# Patient Record
Sex: MALE | HISPANIC OR LATINO | Employment: UNEMPLOYED | URBAN - METROPOLITAN AREA
[De-identification: names, ages, dates, MRNs, and addresses within clinical notes are randomized per-mention and may not be internally consistent; named-entity substitution may affect disease eponyms.]

---

## 2017-05-12 PROCEDURE — 99285 EMERGENCY DEPT VISIT HI MDM: CPT

## 2017-05-13 ENCOUNTER — APPOINTMENT (OUTPATIENT)
Dept: RADIOLOGY | Facility: MEDICAL CENTER | Age: 42
End: 2017-05-13
Attending: EMERGENCY MEDICINE
Payer: COMMERCIAL

## 2017-05-13 ENCOUNTER — APPOINTMENT (OUTPATIENT)
Dept: RADIOLOGY | Facility: MEDICAL CENTER | Age: 42
End: 2017-05-13
Attending: INTERNAL MEDICINE
Payer: COMMERCIAL

## 2017-05-13 ENCOUNTER — HOSPITAL ENCOUNTER (OUTPATIENT)
Facility: MEDICAL CENTER | Age: 42
End: 2017-05-13
Attending: EMERGENCY MEDICINE | Admitting: INTERNAL MEDICINE
Payer: COMMERCIAL

## 2017-05-13 ENCOUNTER — RESOLUTE PROFESSIONAL BILLING HOSPITAL PROF FEE (OUTPATIENT)
Dept: HOSPITALIST | Facility: MEDICAL CENTER | Age: 42
End: 2017-05-13
Payer: COMMERCIAL

## 2017-05-13 VITALS
DIASTOLIC BLOOD PRESSURE: 79 MMHG | HEIGHT: 66 IN | WEIGHT: 239.64 LBS | RESPIRATION RATE: 13 BRPM | HEART RATE: 77 BPM | SYSTOLIC BLOOD PRESSURE: 119 MMHG | BODY MASS INDEX: 38.51 KG/M2 | TEMPERATURE: 98.2 F | OXYGEN SATURATION: 99 %

## 2017-05-13 DIAGNOSIS — R07.9 EXERTIONAL CHEST PAIN: ICD-10-CM

## 2017-05-13 LAB
ALBUMIN SERPL BCP-MCNC: 5.1 G/DL (ref 3.2–4.9)
ALBUMIN/GLOB SERPL: 1.8 G/DL
ALP SERPL-CCNC: 78 U/L (ref 30–99)
ALT SERPL-CCNC: 50 U/L (ref 2–50)
ANION GAP SERPL CALC-SCNC: 11 MMOL/L (ref 0–11.9)
APTT PPP: 29.2 SEC (ref 24.7–36)
AST SERPL-CCNC: 27 U/L (ref 12–45)
BASOPHILS # BLD AUTO: 0.6 % (ref 0–1.8)
BASOPHILS # BLD: 0.06 K/UL (ref 0–0.12)
BILIRUB SERPL-MCNC: 2 MG/DL (ref 0.1–1.5)
BNP SERPL-MCNC: 5 PG/ML (ref 0–100)
BUN SERPL-MCNC: 15 MG/DL (ref 8–22)
CALCIUM SERPL-MCNC: 10.5 MG/DL (ref 8.5–10.5)
CHLORIDE SERPL-SCNC: 105 MMOL/L (ref 96–112)
CO2 SERPL-SCNC: 22 MMOL/L (ref 20–33)
CREAT SERPL-MCNC: 1.16 MG/DL (ref 0.5–1.4)
EKG IMPRESSION: NORMAL
EOSINOPHIL # BLD AUTO: 0.12 K/UL (ref 0–0.51)
EOSINOPHIL NFR BLD: 1.3 % (ref 0–6.9)
ERYTHROCYTE [DISTWIDTH] IN BLOOD BY AUTOMATED COUNT: 39.9 FL (ref 35.9–50)
GFR SERPL CREATININE-BSD FRML MDRD: >60 ML/MIN/1.73 M 2
GLOBULIN SER CALC-MCNC: 2.8 G/DL (ref 1.9–3.5)
GLUCOSE SERPL-MCNC: 109 MG/DL (ref 65–99)
HCT VFR BLD AUTO: 47 % (ref 42–52)
HGB BLD-MCNC: 16.2 G/DL (ref 14–18)
IMM GRANULOCYTES # BLD AUTO: 0.02 K/UL (ref 0–0.11)
IMM GRANULOCYTES NFR BLD AUTO: 0.2 % (ref 0–0.9)
INR PPP: 1.05 (ref 0.87–1.13)
LIPASE SERPL-CCNC: 12 U/L (ref 11–82)
LYMPHOCYTES # BLD AUTO: 2.54 K/UL (ref 1–4.8)
LYMPHOCYTES NFR BLD: 26.8 % (ref 22–41)
MCH RBC QN AUTO: 28.8 PG (ref 27–33)
MCHC RBC AUTO-ENTMCNC: 34.5 G/DL (ref 33.7–35.3)
MCV RBC AUTO: 83.5 FL (ref 81.4–97.8)
MONOCYTES # BLD AUTO: 0.61 K/UL (ref 0–0.85)
MONOCYTES NFR BLD AUTO: 6.4 % (ref 0–13.4)
NEUTROPHILS # BLD AUTO: 6.14 K/UL (ref 1.82–7.42)
NEUTROPHILS NFR BLD: 64.7 % (ref 44–72)
NRBC # BLD AUTO: 0 K/UL
NRBC BLD AUTO-RTO: 0 /100 WBC
PLATELET # BLD AUTO: 305 K/UL (ref 164–446)
PMV BLD AUTO: 10.5 FL (ref 9–12.9)
POTASSIUM SERPL-SCNC: 3.9 MMOL/L (ref 3.6–5.5)
PROT SERPL-MCNC: 7.9 G/DL (ref 6–8.2)
PROTHROMBIN TIME: 14 SEC (ref 12–14.6)
RBC # BLD AUTO: 5.63 M/UL (ref 4.7–6.1)
SODIUM SERPL-SCNC: 138 MMOL/L (ref 135–145)
TROPONIN I SERPL-MCNC: <0.01 NG/ML (ref 0–0.04)
TSH SERPL DL<=0.005 MIU/L-ACNC: 2.51 UIU/ML (ref 0.3–3.7)
WBC # BLD AUTO: 9.5 K/UL (ref 4.8–10.8)

## 2017-05-13 PROCEDURE — G0378 HOSPITAL OBSERVATION PER HR: HCPCS

## 2017-05-13 PROCEDURE — 99235 HOSP IP/OBS SAME DATE MOD 70: CPT | Performed by: HOSPITALIST

## 2017-05-13 PROCEDURE — 85025 COMPLETE CBC W/AUTO DIFF WBC: CPT

## 2017-05-13 PROCEDURE — A9502 TC99M TETROFOSMIN: HCPCS

## 2017-05-13 PROCEDURE — A9270 NON-COVERED ITEM OR SERVICE: HCPCS | Performed by: INTERNAL MEDICINE

## 2017-05-13 PROCEDURE — 85730 THROMBOPLASTIN TIME PARTIAL: CPT

## 2017-05-13 PROCEDURE — 94760 N-INVAS EAR/PLS OXIMETRY 1: CPT

## 2017-05-13 PROCEDURE — A9270 NON-COVERED ITEM OR SERVICE: HCPCS | Performed by: NURSE PRACTITIONER

## 2017-05-13 PROCEDURE — 84443 ASSAY THYROID STIM HORMONE: CPT

## 2017-05-13 PROCEDURE — 36415 COLL VENOUS BLD VENIPUNCTURE: CPT

## 2017-05-13 PROCEDURE — A9270 NON-COVERED ITEM OR SERVICE: HCPCS | Performed by: EMERGENCY MEDICINE

## 2017-05-13 PROCEDURE — 71010 DX-CHEST-LIMITED (1 VIEW): CPT

## 2017-05-13 PROCEDURE — 83690 ASSAY OF LIPASE: CPT

## 2017-05-13 PROCEDURE — 700111 HCHG RX REV CODE 636 W/ 250 OVERRIDE (IP)

## 2017-05-13 PROCEDURE — 83880 ASSAY OF NATRIURETIC PEPTIDE: CPT

## 2017-05-13 PROCEDURE — 700102 HCHG RX REV CODE 250 W/ 637 OVERRIDE(OP): Performed by: INTERNAL MEDICINE

## 2017-05-13 PROCEDURE — 80053 COMPREHEN METABOLIC PANEL: CPT

## 2017-05-13 PROCEDURE — 85610 PROTHROMBIN TIME: CPT

## 2017-05-13 PROCEDURE — 93005 ELECTROCARDIOGRAM TRACING: CPT

## 2017-05-13 PROCEDURE — 700102 HCHG RX REV CODE 250 W/ 637 OVERRIDE(OP): Performed by: EMERGENCY MEDICINE

## 2017-05-13 PROCEDURE — 700102 HCHG RX REV CODE 250 W/ 637 OVERRIDE(OP): Performed by: NURSE PRACTITIONER

## 2017-05-13 PROCEDURE — 700105 HCHG RX REV CODE 258: Performed by: INTERNAL MEDICINE

## 2017-05-13 PROCEDURE — 84484 ASSAY OF TROPONIN QUANT: CPT | Mod: 91

## 2017-05-13 RX ORDER — LEVOTHYROXINE SODIUM 0.05 MG/1
50 TABLET ORAL
COMMUNITY

## 2017-05-13 RX ORDER — AMOXICILLIN 250 MG
2 CAPSULE ORAL 2 TIMES DAILY
Status: DISCONTINUED | OUTPATIENT
Start: 2017-05-13 | End: 2017-05-13 | Stop reason: HOSPADM

## 2017-05-13 RX ORDER — SODIUM CHLORIDE 9 MG/ML
INJECTION, SOLUTION INTRAVENOUS CONTINUOUS
Status: DISCONTINUED | OUTPATIENT
Start: 2017-05-13 | End: 2017-05-13 | Stop reason: HOSPADM

## 2017-05-13 RX ORDER — ASPIRIN 325 MG
325 TABLET ORAL DAILY
Status: DISCONTINUED | OUTPATIENT
Start: 2017-05-13 | End: 2017-05-13 | Stop reason: HOSPADM

## 2017-05-13 RX ORDER — ACETAMINOPHEN 325 MG/1
650 TABLET ORAL EVERY 6 HOURS PRN
Status: DISCONTINUED | OUTPATIENT
Start: 2017-05-13 | End: 2017-05-13 | Stop reason: HOSPADM

## 2017-05-13 RX ORDER — HEPARIN SODIUM 5000 [USP'U]/ML
5000 INJECTION, SOLUTION INTRAVENOUS; SUBCUTANEOUS EVERY 8 HOURS
Status: DISCONTINUED | OUTPATIENT
Start: 2017-05-13 | End: 2017-05-13 | Stop reason: HOSPADM

## 2017-05-13 RX ORDER — ASPIRIN 81 MG/1
324 TABLET, CHEWABLE ORAL DAILY
Status: DISCONTINUED | OUTPATIENT
Start: 2017-05-13 | End: 2017-05-13 | Stop reason: HOSPADM

## 2017-05-13 RX ORDER — ASPIRIN 300 MG/1
300 SUPPOSITORY RECTAL DAILY
Status: DISCONTINUED | OUTPATIENT
Start: 2017-05-13 | End: 2017-05-13 | Stop reason: HOSPADM

## 2017-05-13 RX ORDER — LISINOPRIL 10 MG/1
10 TABLET ORAL DAILY
Status: DISCONTINUED | OUTPATIENT
Start: 2017-05-13 | End: 2017-05-13 | Stop reason: HOSPADM

## 2017-05-13 RX ORDER — ASPIRIN 81 MG/1
81 TABLET, CHEWABLE ORAL DAILY
Qty: 100 TAB | Refills: 3 | Status: SHIPPED | OUTPATIENT
Start: 2017-05-13

## 2017-05-13 RX ORDER — AMLODIPINE BESYLATE 5 MG/1
5 TABLET ORAL DAILY
Status: DISCONTINUED | OUTPATIENT
Start: 2017-05-13 | End: 2017-05-13 | Stop reason: HOSPADM

## 2017-05-13 RX ORDER — NITROGLYCERIN 0.4 MG/1
0.4 TABLET SUBLINGUAL
Status: DISCONTINUED | OUTPATIENT
Start: 2017-05-13 | End: 2017-05-13 | Stop reason: HOSPADM

## 2017-05-13 RX ORDER — LEVOTHYROXINE SODIUM 0.05 MG/1
50 TABLET ORAL
Status: DISCONTINUED | OUTPATIENT
Start: 2017-05-13 | End: 2017-05-13 | Stop reason: HOSPADM

## 2017-05-13 RX ORDER — BISACODYL 10 MG
10 SUPPOSITORY, RECTAL RECTAL
Status: DISCONTINUED | OUTPATIENT
Start: 2017-05-13 | End: 2017-05-13 | Stop reason: HOSPADM

## 2017-05-13 RX ORDER — POLYETHYLENE GLYCOL 3350 17 G/17G
1 POWDER, FOR SOLUTION ORAL
Status: DISCONTINUED | OUTPATIENT
Start: 2017-05-13 | End: 2017-05-13 | Stop reason: HOSPADM

## 2017-05-13 RX ORDER — AMLODIPINE BESYLATE 5 MG/1
5 TABLET ORAL DAILY
COMMUNITY

## 2017-05-13 RX ORDER — REGADENOSON 0.08 MG/ML
INJECTION, SOLUTION INTRAVENOUS
Status: COMPLETED
Start: 2017-05-13 | End: 2017-05-13

## 2017-05-13 RX ADMIN — ACETAMINOPHEN 650 MG: 325 TABLET, FILM COATED ORAL at 05:57

## 2017-05-13 RX ADMIN — NITROGLYCERIN 0.4 MG: 0.4 TABLET SUBLINGUAL at 02:47

## 2017-05-13 RX ADMIN — REGADENOSON 0.4 MG: 0.08 INJECTION, SOLUTION INTRAVENOUS at 12:01

## 2017-05-13 RX ADMIN — AMLODIPINE BESYLATE 5 MG: 5 TABLET ORAL at 12:56

## 2017-05-13 RX ADMIN — ASPIRIN 325 MG: 325 TABLET, COATED ORAL at 09:11

## 2017-05-13 RX ADMIN — LEVOTHYROXINE SODIUM 50 MCG: 50 TABLET ORAL at 05:58

## 2017-05-13 RX ADMIN — NITROGLYCERIN 0.4 MG: 0.4 TABLET SUBLINGUAL at 02:59

## 2017-05-13 RX ADMIN — LISINOPRIL 10 MG: 10 TABLET ORAL at 12:56

## 2017-05-13 RX ADMIN — SODIUM CHLORIDE: 9 INJECTION, SOLUTION INTRAVENOUS at 05:22

## 2017-05-13 ASSESSMENT — LIFESTYLE VARIABLES
DO YOU DRINK ALCOHOL: NO
EVER_SMOKED: NEVER
EVER_SMOKED: NEVER

## 2017-05-13 ASSESSMENT — PAIN SCALES - WONG BAKER
WONGBAKER_NUMERICALRESPONSE: DOESN'T HURT AT ALL

## 2017-05-13 ASSESSMENT — ENCOUNTER SYMPTOMS
DIAPHORESIS: 0
DIARRHEA: 0
VOMITING: 0
FEVER: 0
CHILLS: 0
SHORTNESS OF BREATH: 1
ABDOMINAL PAIN: 0
NAUSEA: 0

## 2017-05-13 ASSESSMENT — PAIN SCALES - GENERAL
PAINLEVEL_OUTOF10: 0

## 2017-05-13 NOTE — PROGRESS NOTES
Stress test results is out. Discharged to home. [facility] teaching. IV line is removed.

## 2017-05-13 NOTE — PROGRESS NOTES
Assumed patient care, Hebrew speaking only. Discuss about stress test today at 11, for troponin at 9:30. Resting comfortably. Sinus Rhythm on cardiac monitor.

## 2017-05-13 NOTE — IP AVS SNAPSHOT
" Home Care Instructions                                                                                                                  Name:Chano Bray  Medical Record Number:[id number]  CSN: [id number]    YOB: 1975   Age: 41 y.o.  Sex: male  HT:1.676 m (5' 6\") WT: 108.7 kg (239 lb 10.2 oz)          Admit Date: 5/13/2017     Discharge Date:   Today's Date: 5/13/2017  Attending Doctor:  Nikolas Hurley M.D.                  Allergies:  Pcn            Discharge Instructions       Discharge Instructions    Discharged to home by car with relative. Discharged via walking, hospital escort: Yes.  Special equipment needed: Not Applicable    Be sure to schedule a follow-up appointment with your primary care doctor or any specialists as instructed.     Discharge Plan:   Diet Plan: Discussed  Activity Level: Discussed  Confirmed Follow up Appointment: Patient to Call and Schedule Appointment  Confirmed Symptoms Management: Discussed  Medication Reconciliation Updated: Yes  Influenza Vaccine Indication: Patient Refuses    I understand that a diet low in cholesterol, fat, and sodium is recommended for good health. Unless I have been given specific instructions below for another diet, I accept this instruction as my diet prescription.   Other diet: Regular Diet    Special Instructions: None    · Is patient discharged on Warfarin / Coumadin?   No     · Is patient Post Blood Transfusion?  No    Depression / Suicide Risk    As you are discharged from this [facility] facility, it is important to learn how to keep safe from harming yourself.    Recognize the warning signs:  · Abrupt changes in personality, positive or negative- including increase in energy   · Giving away possessions  · Change in eating patterns- significant weight changes-  positive or negative  · Change in sleeping patterns- unable to sleep or sleeping all the time   · Unwillingness or inability to communicate  · Depression  · Unusual " sadness, discouragement and loneliness  · Talk of wanting to die  · Neglect of personal appearance   · Rebelliousness- reckless behavior  · Withdrawal from people/activities they love  · Confusion- inability to concentrate     If you or a loved one observes any of these behaviors or has concerns about self-harm, here's what you can do:  · Talk about it- your feelings and reasons for harming yourself  · Remove any means that you might use to hurt yourself (examples: pills, rope, extension cords, firearm)  · Get professional help from the community (Mental Health, Substance Abuse, psychological counseling)  · Do not be alone:Call your Safe Contact- someone whom you trust who will be there for you.  · Call your local CRISIS HOTLINE 636-1411 or 225-020-1996  · Call your local Children's Mobile Crisis Response Team [place] (558) 701-3679 or www.[employer]  · Call the toll free National Suicide Prevention Hotlines   · National Suicide Prevention Lifeline 298-043-ETMQ (3485)  · [employer] Hope Line Network 800-SUICIDE (824-5018)        Follow-up Information     1. Follow up with Pcp Pt States None.    Specialty:  Family Medicine    Why:  please establish a Primary Doctor.          Discharge Medication Instructions:    Below are the medications your physician expects you to take upon discharge:    Review all your home medications and newly ordered medications with your doctor and/or pharmacist. Follow medication instructions as directed by your doctor and/or pharmacist.    Please keep your medication list with you and share with your physician.               Medication List      START taking these medications        Instructions    Morning Afternoon Evening Bedtime    aspirin 81 MG Chew chewable tablet   Commonly known as:  ASA        Take 1 Tab by mouth every day.   Dose:  81 mg                          CONTINUE taking these medications        Instructions    Morning Afternoon Evening Bedtime    amlodipine 5 MG  Tabs   Last time this was given:  5 mg on 5/13/2017 12:56 PM   Commonly known as:  NORVASC        Take 5 mg by mouth every day.   Dose:  5 mg                        levothyroxine 50 MCG Tabs   Last time this was given:  50 mcg on 5/13/2017  5:58 AM   Commonly known as:  SYNTHROID        Take 50 mcg by mouth Every morning on an empty stomach.   Dose:  50 mcg                        lisinopril 10 MG Tabs   Last time this was given:  10 mg on 5/13/2017 12:56 PM   Commonly known as:  PRINIVIL        Take 1 Tab by mouth every day.   Dose:  10 mg                             Where to Get Your Medications      Information about where to get these medications is not yet available     ! Ask your nurse or doctor about these medications    - aspirin 81 MG Chew chewable tablet            Instructions           Diet / Nutrition:    Follow any diet instructions given to you by your doctor or the dietician, including how much salt (sodium) you are allowed each day.    If you are overweight, talk to your doctor about a weight reduction plan.    Activity:    Remain physically active following your doctor's instructions about exercise and activity.    Rest often.     Any time you become even a little tired or short of breath, SIT DOWN and rest.    Worsening Symptoms:    Report any of the following signs and symptoms to the doctor's office immediately:    *Pain of jaw, arm, or neck  *Chest pain not relieved by medication                               *Dizziness or loss of consciousness  *Difficulty breathing even when at rest   *More tired than usual                                       *Bleeding drainage or swelling of surgical site  *Swelling of feet, ankles, legs or stomach                 *Fever (>100ºF)  *Pink or blood tinged sputum  *Weight gain (3lbs/day or 5lbs /week)           *Shock from internal defibrillator (if applicable)  *Palpitations or irregular heartbeats                *Cool and/or numb extremities    Stroke  Awareness    Common Risk Factors for Stroke include:    Age  Atrial Fibrillation  Carotid Artery Stenosis  Diabetes Mellitus  Excessive alcohol consumption  High blood pressure  Overweight   Physical inactivity  Smoking    Warning signs and symptoms of a stroke include:    *Sudden numbness or weakness of the face, arm or leg (especially on one side of the body).  *Sudden confusion, trouble speaking or understanding.  *Sudden trouble seeing in one or both eyes.  *Sudden trouble walking, dizziness, loss of balance or coordination.Sudden severe headache with no known cause.    It is very important to get treatment quickly when a stroke occurs. If you experience any of the above warning signs, call 911 immediately.                   Disclaimer         Quit Smoking / Tobacco Use:    I understand the use of any tobacco products increases my chance of suffering from future heart disease or stroke and could cause other illnesses which may shorten my life. Quitting the use of tobacco products is the single most important thing I can do to improve my health. For further information on smoking / tobacco cessation call a Toll Free Quit Line at 1-625.963.9978 (*National Cancer [place]) or 1-370.620.5701 (American Lung Association) or you can access the web based program at www.lungusa.org.    [place] Tobacco Users Help Line:  (609) 689-7210       Toll Free: 1-578.170.3303  Quit Tobacco Program [facility] [phone])575-1963    Crisis Hotline:    [place] Crisis Hotline:  2-516-HWFEJKJ or 1-567.638.1570    [place] Crisis Hotline:    1-511.374.3946 or 419-847-7627    Discharge Survey:   Thank you for choosing [facility]. We hope we did everything we could to make your hospital stay a pleasant one. You may be receiving a phone survey and we would appreciate your time and participation in answering the questions. Your input is very valuable to us in our efforts to improve our service to our patients and their  families.        My signature on this form indicates that:    1. I have reviewed and understand the above information.  2. My questions regarding this information have been answered to my satisfaction.  3. I have formulated a plan with my discharge nurse to obtain my prescribed medications for home.                  Disclaimer         __________________________________                     __________       ________                       Patient Signature                                                 Date                    Time

## 2017-05-13 NOTE — ED NOTES
"Chief Complaint   Patient presents with   • Chest Pain   • Back Pain   • Shortness of Breath       X 2 weeks. EKG done in triage.  Wife translating.  Hx HTN. When laying flat pt feels like he cannot breathe. Pt in NAD in triage.  No OTC medicine - wife states he doesn't want to take any medicine.     /93 mmHg  Pulse 103  Temp(Src) 37.3 °C (99.1 °F)  Resp 18  Ht 1.753 m (5' 9\")  Wt 110 kg (242 lb 8.1 oz)  BMI 35.80 kg/m2  SpO2 99%      Pt Informed regarding triage process and verbalized understanding to inform triage tech or RN for any changes in condition.  Placed in lobby.    "

## 2017-05-13 NOTE — ED PROVIDER NOTES
ED Provider Note    Scribed for Rufus Garcia M.D. by Linda Lin. 5/13/2017, 2:01 AM.    Primary care provider: Pcp Pt States None  Means of arrival: Walk-in   History obtained from: Patient, wife  History limited by: None     CHIEF COMPLAINT  Chief Complaint   Patient presents with   • Chest Pain   • Back Pain   • Shortness of Breath       HPI  Chano Bray is a 41 y.o. male who presents to the Emergency Department for intermittent left sided chest pain for the past 2 weeks. This pain radiates into his left shoulder. He describes it as a tearing pain in quality. Lifting things exacerbates his pain but just moving his arm or walking does not. The patient reports associated shortness of breath. Laying flat exacerbates his shortness of breath. Wife reports that the patient has a history of hypertension and has been taking his blood pressure medications as prescribed. The patient ran out of his thyroid medications but has began taking them again 3 days ago. Patient denies fevers, chills, nausea, vomiting, diarrhea, abdominal pain, leg pain/swelling, sweating. He denies a history of heart attack, blood clots.       REVIEW OF SYSTEMS  Review of Systems   Constitutional: Negative for fever, chills and diaphoresis.   Respiratory: Positive for shortness of breath.    Cardiovascular: Positive for chest pain. Negative for leg swelling.   Gastrointestinal: Negative for nausea, vomiting, abdominal pain and diarrhea.   All other systems reviewed and are negative.      PAST MEDICAL HISTORY   has a past medical history of Hypertension and Thyroid disease.    SURGICAL HISTORY  patient denies any surgical history    SOCIAL HISTORY  Social History   Substance Use Topics   • Smoking status: Never Smoker    • Smokeless tobacco: None   • Alcohol Use: No      History   Drug Use No       FAMILY HISTORY  History reviewed. No pertinent family history.    CURRENT MEDICATIONS  Home Medications     Reviewed by Sherrill  "SCAR Lopez R.N. (Registered Nurse) on 05/13/17 at 0013  Med List Status: Complete    Medication Last Dose Status    amlodipine (NORVASC) 5 MG Tab 5/12/2017 Active    levothyroxine (SYNTHROID) 50 MCG Tab 5/12/2017 Active    lisinopril (PRINIVIL) 10 MG TABS 5/12/2017 Active                ALLERGIES  Allergies   Allergen Reactions   • Pcn [Penicillins] Swelling       PHYSICAL EXAM  VITAL SIGNS: /93 mmHg  Pulse 84  Temp(Src) 37.3 °C (99.1 °F)  Resp 17  Ht 1.753 m (5' 9\")  Wt 110 kg (242 lb 8.1 oz)  BMI 35.80 kg/m2  SpO2 98%    Constitutional: Well developed, Well nourished, Mild distress.   HENT: Normocephalic, Atraumatic.   Eyes: Conjunctiva normal, No discharge.    Cardiovascular: Normal heart rate, Normal rhythm, No murmurs, equal pulses.   Pulmonary: Normal breath sounds, No respiratory distress, No wheezing, No rales, No rhonchi.  Chest: No chest wall tenderness or deformity.   Abdomen:Soft, No tenderness, No masses, no rebound, no guarding.   Musculoskeletal: No major deformities noted, No tenderness. No calf tenderness or palpable cords. Chest pain not reproducible with movement or palpation left shoulder.  Skin: Warm, Dry, No erythema, No rash.   Neurologic: Alert & oriented x 3, Normal motor function,  No focal deficits noted.   Psychiatric: Affect normal, Judgment normal, Mood normal.     LABS  Results for orders placed or performed during the hospital encounter of 05/13/17   Troponin   Result Value Ref Range    Troponin I <0.01 0.00 - 0.04 ng/mL   Btype Natriuretic Peptide   Result Value Ref Range    B Natriuretic Peptide 5 0 - 100 pg/mL   CBC with Differential   Result Value Ref Range    WBC 9.5 4.8 - 10.8 K/uL    RBC 5.63 4.70 - 6.10 M/uL    Hemoglobin 16.2 14.0 - 18.0 g/dL    Hematocrit 47.0 42.0 - 52.0 %    MCV 83.5 81.4 - 97.8 fL    MCH 28.8 27.0 - 33.0 pg    MCHC 34.5 33.7 - 35.3 g/dL    RDW 39.9 35.9 - 50.0 fL    Platelet Count 305 164 - 446 K/uL    MPV 10.5 9.0 - 12.9 fL    " Neutrophils-Polys 64.70 44.00 - 72.00 %    Lymphocytes 26.80 22.00 - 41.00 %    Monocytes 6.40 0.00 - 13.40 %    Eosinophils 1.30 0.00 - 6.90 %    Basophils 0.60 0.00 - 1.80 %    Immature Granulocytes 0.20 0.00 - 0.90 %    Nucleated RBC 0.00 /100 WBC    Neutrophils (Absolute) 6.14 1.82 - 7.42 K/uL    Lymphs (Absolute) 2.54 1.00 - 4.80 K/uL    Monos (Absolute) 0.61 0.00 - 0.85 K/uL    Eos (Absolute) 0.12 0.00 - 0.51 K/uL    Baso (Absolute) 0.06 0.00 - 0.12 K/uL    Immature Granulocytes (abs) 0.02 0.00 - 0.11 K/uL    NRBC (Absolute) 0.00 K/uL   Complete Metabolic Panel (CMP)   Result Value Ref Range    Sodium 138 135 - 145 mmol/L    Potassium 3.9 3.6 - 5.5 mmol/L    Chloride 105 96 - 112 mmol/L    Co2 22 20 - 33 mmol/L    Anion Gap 11.0 0.0 - 11.9    Glucose 109 (H) 65 - 99 mg/dL    Bun 15 8 - 22 mg/dL    Creatinine 1.16 0.50 - 1.40 mg/dL    Calcium 10.5 8.5 - 10.5 mg/dL    AST(SGOT) 27 12 - 45 U/L    ALT(SGPT) 50 2 - 50 U/L    Alkaline Phosphatase 78 30 - 99 U/L    Total Bilirubin 2.0 (H) 0.1 - 1.5 mg/dL    Albumin 5.1 (H) 3.2 - 4.9 g/dL    Total Protein 7.9 6.0 - 8.2 g/dL    Globulin 2.8 1.9 - 3.5 g/dL    A-G Ratio 1.8 g/dL   Prothrombin Time   Result Value Ref Range    PT 14.0 12.0 - 14.6 sec    INR 1.05 0.87 - 1.13   APTT   Result Value Ref Range    APTT 29.2 24.7 - 36.0 sec   Lipase   Result Value Ref Range    Lipase 12 11 - 82 U/L   ESTIMATED GFR   Result Value Ref Range    GFR If African American >60 >60 mL/min/1.73 m 2    GFR If Non African American >60 >60 mL/min/1.73 m 2   EKG (NOW)   Result Value Ref Range    Report       [facility] Emergency Dept.    Test Date:  2017  Pt Name:    MICKEY REN      Department: ER  MRN:        [id number]                      Room:  Gender:                                 Technician: [id number]  :        1975                   Requested By:ER TRIAGE PROTOCOL  Order #:    [id number]                    Reading MD:    Measurements  Intervals                                 Axis  Rate:       95                           P:          55  SC:         184                          QRS:        105  QRSD:       110                          T:          34  QT:         352  QTc:        443    Interpretive Statements  SINUS RHYTHM  CONSIDER LEFT ATRIAL ABNORMALITY  NONSPECIFIC INTRAVENTRICULAR CONDUCTION DELAY  BASELINE WANDER IN LEAD(S) V3  Compared to ECG 02/25/2014 13:12:34  Intraventricular conduction delay now present  Left posterior fascicular block no longer present     All labs reviewed by me.    EKG  12 Lead EKG interpreted by me shows a normal sinus rhythm at a rate of 95. Axis normal. No ST elevations. Nonspecific interventricular conduction delay. No T wave inversions. Old EKG from 2/25/14 shows no significant changes. Final impression: Left poster fascicular block.     RADIOLOGY  DX-CHEST-LIMITED (1 VIEW)   Final Result         No acute cardiopulmonary abnormalities are identified.      The radiologist's interpretation of all radiological studies have been reviewed by me.    COURSE & MEDICAL DECISION MAKING  Pertinent Labs & Imaging studies reviewed. (See chart for details)    2:01 AM - Patient seen and examined at bedside. Patient will be treated with 0.4 mg nitroglycerin PO. Ordered for a chest XR, EKG, labs to evaluate his symptoms. The plan of care was discussed with the patient and I answered all of his questions at this time. The patient understands and is agreeable with this plan of care.       Medical Decision Making: Patient presents with exertional chest pain that is unrelieved with nitroglycerin. At this point in time I think the patient would benefit from further risk stratification with admission and repeat enzymes and possible stress test. Do not think the patient is a pulmonary embolism does not have any signs of DVT. He is not hypoxic or tachycardic. Do not think this is a aortic dissection repair tearing does not go to his  back.    DISPOSITION:  Patient will be admitted to Dr. Onofre in guarded condition.     FINAL IMPRESSION  1. Exertional chest pain         iLnda PASTRANA (Scribe), am scribing for, and in the presence of, Rufus Garcia M.D.    Electronically signed by: Linda Lin (Scribbrian), 5/13/2017    IRufus M.D. personally performed the services described in this documentation, as scribed by Linda Lin in my presence, and it is both accurate and complete.    The note accurately reflects work and decisions made by me.  Rufus Garcia  5/13/2017  4:08 AM

## 2017-05-13 NOTE — PROGRESS NOTES
Received from red  pod, aox4, sr on monitor, steady on his feet, Bulgarian speaking only. Denies pain or sob. Call light within reach. Needs attended. Plan of care discussed and understood.

## 2017-05-13 NOTE — H&P
CHIEF COMPLAINT:  Chest pain, shortness of breath.    HISTORY OF PRESENT ILLNESS:  Patient is a 41-year-old male with a history of   hypertension, hypothyroidism, who actually has not taken his hypothyroid   medication for the last 2 weeks and just started back on then 3 days ago,   presents today to the ER complaining of left-sided chest pain going on for the   last 2 weeks that goes up to his shoulder and neck.  Patient's pain is mostly   when he is trying to lift things.  He also complained of shortness of breath   with the pain.  Per him pain is like a 4/10 in intensity.  Denies any nausea   or vomiting.  He also feels a little bit lightheaded and also feeling some   numbness and tingling on his left arm.  Denies any headaches.    REVIEW OF SYSTEMS:  All negative except as per HPI.    PAST MEDICAL HISTORY:  History of hypertension and hypothyroidism.    PAST SURGICAL HISTORY:  Patient states none.    ALLERGIES:  No known allergies.    SOCIAL HISTORY:  Denies any alcohol, tobacco, or drug use.    FAMILY HISTORY:  No family history of coronary artery disease or diabetes.    PHYSICAL EXAMINATION:  VITAL SIGNS:  Blood pressure 148/93, respiration rate 18, pulse 103,   temperature 99.1.  GENERAL:  No acute distress.  Nontoxic appearance.  HEENT:  Normocephalic and atraumatic.  Pupils are equal, round and reactive to   light.  NECK:  Supple.  No adenopathy.  CARDIOVASCULAR:  S1, S2 normal.  No murmurs or gallops appreciated.  LUNGS:  Clear to auscultation bilaterally.  No rales, rhonchi or wheezing.  ABDOMEN:  Soft, nontender, positive bowel sounds.  EXTREMITIES:  No edema, cyanosis, or clubbing.  NEUROLOGIC:  No focal deficit.  Alert and oriented x4.    LABORATORY WORK:  WBC is 9.5, hemoglobin 16.2, hematocrit 47.  Sodium 138,   potassium 3.9, chloride 105, bicarbonate 22, glucose 109, BUN 15, creatinine   1.16.  Troponin 0.01.  BNP 5.    IMAGING:  Chest x-ray showed no acute cardiopulmonary process.    ASSESSMENT  AND PLAN:  1.  Chest pain going on for 2 weeks and also some numbness and tingling of the   left arm.  First troponin was negative.  We will continue to trend troponin   at this time.  We will do a stress test in the morning and continue to monitor   on tele.  2.  Hypothyroidism.  The patient has been off his thyroid medication for 2   weeks, started back again 2 days ago.  We will check a TSH, continue to   monitor.  3.  History of hypertension.  Continue outpatient regimen.  4.  Prophylactic deep vein thrombosis, we will start patient on heparin   subcutaneously.  5.  Code status:  Patient is a full code.       ____________________________________     MD MONIKA WORRELL / RUDDY    DD:  05/13/2017 04:21:41  DT:  05/13/2017 04:55:54    D#:  [id number]  Job#:  [id number]

## 2017-05-13 NOTE — DISCHARGE INSTRUCTIONS
Discharge Instructions    Discharged to home by car with relative. Discharged via walking, hospital escort: Yes.  Special equipment needed: Not Applicable    Be sure to schedule a follow-up appointment with your primary care doctor or any specialists as instructed.     Discharge Plan:   Diet Plan: Discussed  Activity Level: Discussed  Confirmed Follow up Appointment: Patient to Call and Schedule Appointment  Confirmed Symptoms Management: Discussed  Medication Reconciliation Updated: Yes  Influenza Vaccine Indication: Patient Refuses    I understand that a diet low in cholesterol, fat, and sodium is recommended for good health. Unless I have been given specific instructions below for another diet, I accept this instruction as my diet prescription.   Other diet: Regular Diet    Special Instructions: None    · Is patient discharged on Warfarin / Coumadin?   No     · Is patient Post Blood Transfusion?  No    Depression / Suicide Risk    As you are discharged from this [facility] facility, it is important to learn how to keep safe from harming yourself.    Recognize the warning signs:  · Abrupt changes in personality, positive or negative- including increase in energy   · Giving away possessions  · Change in eating patterns- significant weight changes-  positive or negative  · Change in sleeping patterns- unable to sleep or sleeping all the time   · Unwillingness or inability to communicate  · Depression  · Unusual sadness, discouragement and loneliness  · Talk of wanting to die  · Neglect of personal appearance   · Rebelliousness- reckless behavior  · Withdrawal from people/activities they love  · Confusion- inability to concentrate     If you or a loved one observes any of these behaviors or has concerns about self-harm, here's what you can do:  · Talk about it- your feelings and reasons for harming yourself  · Remove any means that you might use to hurt yourself (examples: pills, rope, extension cords,  firearm)  · Get professional help from the community (Mental Health, Substance Abuse, psychological counseling)  · Do not be alone:Call your Safe Contact- someone whom you trust who will be there for you.  · Call your local CRISIS HOTLINE 663-6907 or 876-769-1454  · Call your local Children's Mobile Crisis Response Team [place] (738) 146-1469 or www.[employer].com  · Call the toll free National Suicide Prevention Hotlines   · National Suicide Prevention Lifeline 031-774-GXWI (8833)  · National Hope Line Network 800-SUICIDE (786-1809)

## 2017-05-14 NOTE — DISCHARGE SUMMARY
CHIEF COMPLAINT ON ADMISSION:  Chest pain.    HISTORY OF PRESENT ILLNESS:  For complete history and physical, please refer   to the note posted by Dr. Onofre on 05/13/2017.  In summary, this is a   41-year-old male who presented to the emergency room with complaints of   left-sided chest pain that has been ongoing for approximately 2 weeks.  The   patient had been off of his thyroid medication for approximately 2 weeks prior   to this.  On admission, initial laboratory workup was unremarkable including   a troponin less than 0.01.  Chest x-ray was negative for any acute   cardiopulmonary processes.  Cardiac stress test was negative for a reversible   ischemia or infarct.  The patient's vital signs remained stable over   admission.  The patient had no further complaints of chest pain.  The patient   has remained stable and there is no evidence of acute coronary syndrome.  The   patient will be discharged home at this time.    DISCHARGE PROBLEM LIST:  1.  Chest pain, resolved.  2.  Hypothyroidism.  3.  Hypertension.    FOLLOWUP APPOINTMENTS:  The patient is recommended to follow up with his   primary care physician in 1 week after discharge.    DISCHARGE MEDICATIONS:  1.  Norvasc 5 mg tab, take 5 mg by mouth every day.  2.  Aspirin 81 mg tab, take 1 tab by mouth every day.  3.  Synthroid 50 mcg tab, take 50 mcg by mouth every morning on empty stomach.  4.  Lisinopril 10 mg tabs 1 tab by mouth daily.    DIET:  Regular diet.    ACTIVITY:  As tolerated.    LABORATORY DATA:  Sodium 138, potassium 3.9, chloride 105, CO2 of 22, glucose   109, BUN 15, creatinine 1.16, blood cells 9.5, hemoglobin 16.2, hematocrit   47.0, platelet count 305.    Total discharge process was approximately 33 minutes.       ____________________________________     VIRGEN Knutson / N[facility]    DD:  05/13/2017 13:33:45  DT:  05/14/2017 00:41:38    D#:  [id number]  Job#:  [id number]

## 2017-05-22 NOTE — ADDENDUM NOTE
Encounter addended by: Shani Gray R.N. on: 5/22/2017  6:21 AM<BR>     Documentation filed: Inpatient Document Flowsheet

## 2017-09-15 ENCOUNTER — HOSPITAL ENCOUNTER (OUTPATIENT)
Facility: MEDICAL CENTER | Age: 42
End: 2017-09-15
Attending: PHYSICIAN ASSISTANT
Payer: COMMERCIAL

## 2017-09-15 LAB
AMBIGUOUS DTTM AMBI4: NORMAL
SIGNIFICANT IND 70042: NORMAL
SOURCE SOURCE: NORMAL

## 2017-09-15 PROCEDURE — 87086 URINE CULTURE/COLONY COUNT: CPT

## 2017-09-15 PROCEDURE — 87591 N.GONORRHOEAE DNA AMP PROB: CPT

## 2017-09-15 PROCEDURE — 87491 CHLMYD TRACH DNA AMP PROBE: CPT

## 2017-09-17 LAB
BACTERIA UR CULT: NORMAL
C TRACH DNA SPEC QL NAA+PROBE: NEGATIVE
N GONORRHOEA DNA SPEC QL NAA+PROBE: NEGATIVE
SIGNIFICANT IND 70042: NORMAL
SOURCE SOURCE: NORMAL
SPEC CONTAINER SPEC: NORMAL
SPECIMEN SOURCE: NORMAL